# Patient Record
Sex: FEMALE | Race: WHITE | Employment: UNEMPLOYED | URBAN - METROPOLITAN AREA
[De-identification: names, ages, dates, MRNs, and addresses within clinical notes are randomized per-mention and may not be internally consistent; named-entity substitution may affect disease eponyms.]

---
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
## 2019-04-15 DIAGNOSIS — Z30.41 ENCOUNTER FOR SURVEILLANCE OF CONTRACEPTIVE PILLS: ICD-10-CM

## 2019-04-15 NOTE — TELEPHONE ENCOUNTER
Reason for call:  Medication   If this is a refill request, has the caller requested the refill from the pharmacy already? No  Will the patient be using a [facility]? No  Name of the pharmacy and phone number for the current request: [facility] [place]: [phone]    Name of the medication requested: CATRACHITO 3-0.02 MG tablet    Other request: needs as soon as possible    Phone number to reach patient:  Cell number on file:    Telephone Information:   Mobile [phone]       Best Time:  anytime    Can we leave a detailed message on this number?  NO

## 2019-04-15 NOTE — TELEPHONE ENCOUNTER
"Requested Prescriptions   Pending Prescriptions Disp Refills     drospirenone-ethinyl estradiol (CATRACHITO) 3-0.02 MG tablet  Last Written Prescription Date:  12/26/2018  Last Fill Quantity: 84 tablet,  # refills: 0   Last office visit: 1/4/18 with prescribing provider:  Santiago     Future Office Visit:       84 tablet 0     Sig: Take 1 tablet by mouth daily       Contraceptives Protocol Failed - 4/15/2019  2:35 PM        Failed - Recent (12 mo) or future (30 days) visit within the authorizing provider's specialty     Patient had office visit in the last 12 months or has a visit in the next 30 days with authorizing provider or within the authorizing provider's specialty.  See \"Patient Info\" tab in inbasket, or \"Choose Columns\" in Meds & Orders section of the refill encounter.              Passed - Patient is not a current smoker if age is 35 or older        Passed - Medication is active on med list        Passed - No active pregnancy on record        Passed - No positive pregnancy test in past 12 months        "

## 2019-04-16 RX ORDER — DROSPIRENONE AND ETHINYL ESTRADIOL 0.02-3(28)
1 KIT ORAL DAILY
Qty: 28 TABLET | Refills: 0 | Status: SHIPPED | OUTPATIENT
Start: 2019-04-16 | End: 2019-05-17

## 2019-04-16 NOTE — TELEPHONE ENCOUNTER
Medication is being filled for 1 time refill only due to:  Patient needs to be seen because it has been more than one year since last visit.  Zohra St, SITAN, RN  [facility]

## 2019-05-17 ENCOUNTER — OFFICE VISIT (OUTPATIENT)
Dept: FAMILY MEDICINE | Facility: CLINIC | Age: 19
End: 2019-05-17
Payer: COMMERCIAL

## 2019-05-17 VITALS
OXYGEN SATURATION: 99 % | BODY MASS INDEX: 20.9 KG/M2 | WEIGHT: 118 LBS | TEMPERATURE: 98.2 F | HEART RATE: 82 BPM | DIASTOLIC BLOOD PRESSURE: 70 MMHG | SYSTOLIC BLOOD PRESSURE: 120 MMHG

## 2019-05-17 DIAGNOSIS — Z30.41 ENCOUNTER FOR SURVEILLANCE OF CONTRACEPTIVE PILLS: Primary | ICD-10-CM

## 2019-05-17 DIAGNOSIS — Z11.3 SCREENING EXAMINATION FOR VENEREAL DISEASE: ICD-10-CM

## 2019-05-17 PROCEDURE — 99213 OFFICE O/P EST LOW 20 MIN: CPT | Performed by: NURSE PRACTITIONER

## 2019-05-17 PROCEDURE — 87491 CHLMYD TRACH DNA AMP PROBE: CPT | Performed by: NURSE PRACTITIONER

## 2019-05-17 PROCEDURE — 87591 N.GONORRHOEAE DNA AMP PROB: CPT | Performed by: NURSE PRACTITIONER

## 2019-05-17 RX ORDER — DROSPIRENONE AND ETHINYL ESTRADIOL 0.02-3(28)
1 KIT ORAL DAILY
Qty: 84 TABLET | Refills: 3 | Status: SHIPPED | OUTPATIENT
Start: 2019-05-17 | End: 2020-05-05

## 2019-05-17 RX ORDER — NAPROXEN 500 MG/1
500 TABLET ORAL
COMMUNITY
Start: 2019-04-30 | End: 2020-05-05

## 2019-05-17 NOTE — LETTER
May 20, 2019      Gregoria C Monika  [address] [place] [address]        Dear ,    We are writing to inform you of your test results.        Resulted Orders   NEISSERIA GONORRHOEA PCR   Result Value Ref Range    Specimen Descrip Urine     N Gonorrhea PCR Negative NEG^Negative      Comment:      Negative for N. gonorrhoeae rRNA by transcription mediated amplification.  A negative result by transcription mediated amplification does not preclude   the presence of N. gonorrhoeae infection because results are dependent on   proper and adequate collection, absence of inhibitors, and sufficient rRNA to   be detected.     CHLAMYDIA TRACHOMATIS PCR   Result Value Ref Range    Specimen Description Urine     Chlamydia Trachomatis PCR Negative NEG^Negative      Comment:      Negative for C. trachomatis rRNA by transcription mediated amplification.  A negative result by transcription mediated amplification does not preclude   the presence of C. trachomatis infection because results are dependent on   proper and adequate collection, absence of inhibitors, and sufficient rRNA to   be detected.         If you have any questions or concerns, please call the clinic at the number listed above.       Sincerely,        Phillip Faria NP

## 2019-05-17 NOTE — PROGRESS NOTES
SUBJECTIVE:   Gregoria Frank is a 19 year old female who presents to clinic today for the following   health issues:      Medication Followup of birth control pill    Taking Medication as prescribed: yes    Side Effects:  None    Medication Helping Symptoms:  yes     HPI: Gregoria is here today for medication check and refill of her OCP. No changes / developments in her health status since last medication check. No side effects. Effective. No smoking. No migraine with aura. No blood clotting disorders. No hypertension.     Additional history: as documented    Reviewed  and updated as needed this visit by clinical staff  Tobacco  Allergies  Meds  Problems  Med Hx  Surg Hx  Fam Hx  Soc Hx          Reviewed and updated as needed this visit by Provider  Tobacco  Allergies  Meds  Problems  Med Hx  Surg Hx  Fam Hx         Patient Active Problem List   Diagnosis     NO ACTIVE PROBLEMS     Past Surgical History:   Procedure Laterality Date     NO HISTORY OF SURGERY         Social History     Tobacco Use     Smoking status: Never Smoker     Smokeless tobacco: Never Used   Substance Use Topics     Alcohol use: Yes     Family History   Problem Relation Age of Onset     Family History Negative Unknown            ROS:  Constitutional, HEENT, cardiovascular, pulmonary, GI, , musculoskeletal, neuro, skin, endocrine and psych systems are negative, except as otherwise noted.    OBJECTIVE:     /70   Pulse 82   Temp 98.2  F (36.8  C) (Tympanic)   Wt 53.5 kg (118 lb)   LMP 05/17/2019   SpO2 99%   BMI 20.90 kg/m    Body mass index is 20.9 kg/m .  GENERAL: healthy, alert and no distress  PSYCH: mentation appears normal, affect normal/bright    Diagnostic Test Results:  No results found for this or any previous visit (from the past 24 hour(s)).    ASSESSMENT/PLAN:     Gregoria was seen today for recheck medication. No indication to change regimen or stop prescribing OCP for her. Will refill for one year.  Due for G-C screen, so will order that today.  Discussed reasons to call or return to clinic. Gregoria acknowledges and demonstrates understanding of circumstances under which care should be sought urgently or emergently. Follow up as discussed. Discussed risks, benefits, alternatives, potential side effects, and proper administration of medication / treatment. Agrees with plan of care. All questions answered.     Diagnoses and all orders for this visit:    Encounter for surveillance of contraceptive pills  -     drospirenone-ethinyl estradiol (CATRACHITO) 3-0.02 MG tablet; Take 1 tablet by mouth daily    Screening examination for venereal disease  -     NEISSERIA GONORRHOEA PCR  -     CHLAMYDIA TRACHOMATIS PCR      See Patient Instructions    Phillip Faria NP  [facility]

## 2019-08-13 DIAGNOSIS — Z30.41 ENCOUNTER FOR SURVEILLANCE OF CONTRACEPTIVE PILLS: ICD-10-CM

## 2019-08-13 RX ORDER — DROSPIRENONE AND ETHINYL ESTRADIOL 0.02-3(28)
1 KIT ORAL DAILY
Qty: 84 TABLET | Refills: 3 | OUTPATIENT
Start: 2019-08-13

## 2019-08-13 NOTE — TELEPHONE ENCOUNTER
"Requested Prescriptions   Pending Prescriptions Disp Refills     drospirenone-ethinyl estradiol (CATRACHITO) 3-0.02 MG tablet 84 tablet 3     Sig: Take 1 tablet by mouth daily  Last Written Prescription Date:  5/17/19  Last Fill Quantity: 84,  # refills: 3   Last office visit: 5/17/2019 with prescribing provider:  Bienvenido   Future Office Visit:           Contraceptives Protocol Passed - 8/13/2019 12:12 PM        Passed - Patient is not a current smoker if age is 35 or older        Passed - Recent (12 mo) or future (30 days) visit within the authorizing provider's specialty     Patient had office visit in the last 12 months or has a visit in the next 30 days with authorizing provider or within the authorizing provider's specialty.  See \"Patient Info\" tab in inbasket, or \"Choose Columns\" in Meds & Orders section of the refill encounter.              Passed - Medication is active on med list        Passed - No active pregnancy on record        Passed - No positive pregnancy test in past 12 months          "

## 2019-08-13 NOTE — TELEPHONE ENCOUNTER
Reason for Call:  Medication or medication refill:    Do you use a [facility]?  Name of the pharmacy and phone number for the current request:      [facility] [place] [address]    Name of the medication requested: drospirenone-ethinyl estradiol (CATRACHITO) 3-0.02 MG tablet    Can we leave a detailed message on this number? YES    Phone number patient can be reached at: Cell number on file:    Telephone Information:   Mobile [phone]     Best Time: Anytime    Call taken on 8/13/2019 at 12:08 PM by Julieth Tamez

## 2019-08-14 NOTE — TELEPHONE ENCOUNTER
Xiomy Maxwell's calling [facility], [phone] request for refill on birth control. Advised of below should be refills left. However original RX was sent to  pharmacy [facility]. Alissa is not sure if the patient requested this so she will need to get more information. Advised to let us know if patient wants to transfer RX. Lea Crews R.N.

## 2019-10-31 DIAGNOSIS — Z30.41 ENCOUNTER FOR SURVEILLANCE OF CONTRACEPTIVE PILLS: ICD-10-CM

## 2019-10-31 NOTE — TELEPHONE ENCOUNTER
"Reason for Call:  Medication or medication refill:    Do you use a [facility]?  Name of the pharmacy and phone number for the current request:     [facility]    Name of the medication requested: drospirenone-ethinyl estradiol (CATRACHITO) 3-0.02 MG tablet     Other request:     Can we leave a detailed message on this number? YES    Phone number patient can be reached at: Home number on file [phone] (home)    Best Time: anytime     Call taken on 10/31/2019 at 3:55 PM by Magnolia Gonzáles    Requested Prescriptions   Pending Prescriptions Disp Refills     drospirenone-ethinyl estradiol (CATRACHITO) 3-0.02 MG tablet  Last Written Prescription Date:  5/17/19  Last Fill Quantity: 84,  # refills: 3   Last Office Visit: 5/17/2019   Future Office Visit:      84 tablet 3     Sig: Take 1 tablet by mouth daily       Contraceptives Protocol Failed - 10/31/2019  3:55 PM        Failed - Recent (12 mo) or future (30 days) visit within the authorizing provider's specialty     Patient has had an office visit with the authorizing provider or a provider within the authorizing providers department within the previous 12 mos or has a future within next 30 days. See \"Patient Info\" tab in inbasket, or \"Choose Columns\" in Meds & Orders section of the refill encounter.              Passed - Patient is not a current smoker if age is 35 or older        Passed - Medication is active on med list        Passed - No active pregnancy on record        Passed - No positive pregnancy test in past 12 months          "

## 2019-11-01 ENCOUNTER — TELEPHONE (OUTPATIENT)
Dept: FAMILY MEDICINE | Facility: CLINIC | Age: 19
End: 2019-11-01

## 2019-11-01 RX ORDER — DROSPIRENONE AND ETHINYL ESTRADIOL 0.02-3(28)
1 KIT ORAL DAILY
Qty: 84 TABLET | Refills: 3 | OUTPATIENT
Start: 2019-11-01

## 2019-11-01 NOTE — TELEPHONE ENCOUNTER
Reason for Call:  medication refill:    Do you use a [facility]?  Name of the pharmacy and phone number for the current request:  [facility]   Name of the medication requested    O  Can we leave a detailed message on this number? YES    Phone number patient can be reached at: Cell number on file:    Telephone Information:   Mobile [phone]       Best Time: any    Call taken on 11/1/2019 at 3:09 PM by Gloria Godoy

## 2019-11-01 NOTE — TELEPHONE ENCOUNTER
Pt has been on a birth control (Candice) the generic form is not working for her. She would like to go back on the main brand.  She is have trouble getting it filled. Please call Pt back to get this issue handles - thanks  [phone]

## 2019-11-07 NOTE — TELEPHONE ENCOUNTER
PA Initiation    Medication: yaxz - BRAND - INITIATED  Insurance Company: [employer] - Phone [phone] Fax [phone]  Pharmacy Filling the Rx: [facility] [place] - [address]  Filling Pharmacy Phone: [phone]  Filling Pharmacy Fax:    Start Date: 11/7/2019

## 2019-11-07 NOTE — TELEPHONE ENCOUNTER
Prior Authorization Retail Medication Request    Medication/Dose: MARIA ALEJANDRA tablets 28S (green pkg)  ICD code (if different than what is on RX):    Previously Tried and Failed: drospirenone-ethinyl estradiol (CATRACHITO) 3-0.02 MG tablet   Rationale:      Insurance Name:  [employer]  Insurance ID: [id number]       Pharmacy Information (if different than what is on RX)  Name:    Phone:

## 2019-11-12 NOTE — TELEPHONE ENCOUNTER
PRIOR AUTHORIZATION DENIED    Medication: yaxz - BRAND - DENIED    Denial Date: 11/11/2019    Denial Rational:       Appeal Information:

## 2019-11-20 NOTE — TELEPHONE ENCOUNTER
Please call Gregoria and let her know that her PA was denied because she has not tried and failed 3 other agents within that class. We could try another of the alternatives (besides Alma) if she wants.     Thanks.

## 2020-04-28 DIAGNOSIS — Z30.41 ENCOUNTER FOR SURVEILLANCE OF CONTRACEPTIVE PILLS: ICD-10-CM

## 2020-04-28 NOTE — TELEPHONE ENCOUNTER
TC will try to contact patient again tomorrow. Unable to discuss as patients phone kept ringing.    .Zohra LONGO    [facility] Cait George

## 2020-05-05 ENCOUNTER — VIRTUAL VISIT (OUTPATIENT)
Dept: FAMILY MEDICINE | Facility: CLINIC | Age: 20
End: 2020-05-05
Payer: COMMERCIAL

## 2020-05-05 DIAGNOSIS — Z30.41 ENCOUNTER FOR SURVEILLANCE OF CONTRACEPTIVE PILLS: ICD-10-CM

## 2020-05-05 PROCEDURE — 99213 OFFICE O/P EST LOW 20 MIN: CPT | Mod: TEL | Performed by: NURSE PRACTITIONER

## 2020-05-05 RX ORDER — DROSPIRENONE AND ETHINYL ESTRADIOL 0.02-3(28)
1 KIT ORAL DAILY
Qty: 84 TABLET | Refills: 3 | Status: SHIPPED | OUTPATIENT
Start: 2020-05-05 | End: 2021-04-22

## 2020-05-05 RX ORDER — DROSPIRENONE AND ETHINYL ESTRADIOL 0.02-3(28)
1 KIT ORAL DAILY
Qty: 84 TABLET | Refills: 3 | Status: CANCELLED | OUTPATIENT
Start: 2020-05-05

## 2020-05-05 NOTE — TELEPHONE ENCOUNTER
Pt returned call. Scheduled tel visit med check with Phillip Faria today 5/5/20 at 11:40am (Pt declined visit visit). Thanks!

## 2020-05-05 NOTE — PROGRESS NOTES
"Gregoria Frank is a 20 year old female who is being evaluated via a billable telephone visit.      The patient has been notified of following:     \"This telephone visit will be conducted via a call between you and your physician/provider. We have found that certain health care needs can be provided without the need for a physical exam.  This service lets us provide the care you need with a short phone conversation.  If a prescription is necessary we can send it directly to your pharmacy.  If lab work is needed we can place an order for that and you can then stop by our lab to have the test done at a later time.    Telephone visits are billed at different rates depending on your insurance coverage. During this emergency period, for some insurers they may be billed the same as an in-person visit.  Please reach out to your insurance provider with any questions.    If during the course of the call the physician/provider feels a telephone visit is not appropriate, you will not be charged for this service.\"    Patient has given verbal consent for Telephone visit?  Yes    What phone number would you like to be contacted at? [phone]    How would you like to obtain your AVS? no    Subjective     Gregoria Frank is a 20 year old female who presents to clinic today for the following health issues:      Medication Followup of drospirenone-ethinyl estradiol     Taking Medication as prescribed: yes    Side Effects:  None    Medication Helping Symptoms:  yes     HPI: Gregoria calls today for medication check (OCP). She relates that this medication has worked quite well for her and she has endured so side effects. No changes to her medical history in the past year that would affect her candidacy for this regimen.     Denies:  Personal or family history of blood clots  Smoking  HTN  Migraine with aura     Patient Active Problem List   Diagnosis     NO ACTIVE PROBLEMS     Past Surgical History:   Procedure Laterality Date "     NO HISTORY OF SURGERY         Social History     Tobacco Use     Smoking status: Never Smoker     Smokeless tobacco: Never Used   Substance Use Topics     Alcohol use: Yes     Family History   Problem Relation Age of Onset     Family History Negative Unknown            Reviewed and updated as needed this visit by Provider  Tobacco  Allergies  Meds  Problems  Med Hx  Surg Hx  Fam Hx         Review of Systems   ROS COMP: Constitutional, cardiovascular,  systems are negative, except as otherwise noted.       Objective   Reported vitals:  There were no vitals taken for this visit.   alert, no distress and cooperative  PSYCH: Alert and oriented times 3; coherent speech, normal   rate and volume, able to articulate logical thoughts, able   to abstract reason, no tangential thoughts, no hallucinations   or delusions  Her affect is normal and pleasant  RESP: No cough, no audible wheezing, able to talk in full sentences  Remainder of exam unable to be completed due to telephone visits    Diagnostic Test Results:  Labs reviewed in Epic        Assessment/Plan:  1. Encounter for surveillance of contraceptive pills  Comment: Medication therapy working well, and she is having no side effects. Will refill for 12 months. She will report back if she encounters issues or if she develops medical problems that could affect her candidacy to take this medication.   - drospirenone-ethinyl estradiol (CATRACHITO) 3-0.02 MG tablet; Take 1 tablet by mouth daily  Dispense: 84 tablet; Refill: 3    Return in about 1 year (around 5/5/2021) for Physical Exam.      Phone call duration:  5 minutes      Phillip Faria NP

## 2020-05-05 NOTE — TELEPHONE ENCOUNTER
Routing to CINDY Faria. Patient has telephone visit today with you. Was due for visit, please address refill for patient. Thank you.    Jannie Zarate RN, BSN  [facility]

## 2021-04-21 DIAGNOSIS — Z30.41 ENCOUNTER FOR SURVEILLANCE OF CONTRACEPTIVE PILLS: ICD-10-CM

## 2021-04-22 RX ORDER — DROSPIRENONE AND ETHINYL ESTRADIOL 0.02-3(28)
1 KIT ORAL DAILY
Qty: 84 TABLET | Refills: 0 | Status: SHIPPED | OUTPATIENT
Start: 2021-04-22

## 2021-04-22 NOTE — TELEPHONE ENCOUNTER
Prescription approved per [facility] Refill Protocol.  Pt due to be seen in May. 90 days given.     Chelsie Hodges RN

## 2022-05-05 DIAGNOSIS — Z30.41 ENCOUNTER FOR SURVEILLANCE OF CONTRACEPTIVE PILLS: ICD-10-CM

## 2022-05-06 NOTE — TELEPHONE ENCOUNTER
If patient calls back needs to be scheduled to establish care last seen may 2020     Patient Contact    Attempt # 1    Was call answered?  No.  Left message on voicemail with information to call me back.

## 2022-05-10 RX ORDER — DROSPIRENONE AND ETHINYL ESTRADIOL 0.02-3(28)
1 KIT ORAL DAILY
Qty: 84 TABLET | Refills: 0 | OUTPATIENT
Start: 2022-05-10

## 2022-05-10 NOTE — TELEPHONE ENCOUNTER
Routing refill request to provider for review/approval because:  Patient needs to be seen because it has been more than 1 year since last office visit.  Patient has not established care with new PCP.  Aliyah Zuniga RN